# Patient Record
Sex: MALE | Race: BLACK OR AFRICAN AMERICAN | NOT HISPANIC OR LATINO | Employment: FULL TIME | URBAN - METROPOLITAN AREA
[De-identification: names, ages, dates, MRNs, and addresses within clinical notes are randomized per-mention and may not be internally consistent; named-entity substitution may affect disease eponyms.]

---

## 2019-04-19 ENCOUNTER — HOSPITAL ENCOUNTER (EMERGENCY)
Facility: HOSPITAL | Age: 29
Discharge: HOME | End: 2019-04-19
Attending: EMERGENCY MEDICINE

## 2019-04-19 VITALS
SYSTOLIC BLOOD PRESSURE: 163 MMHG | HEART RATE: 83 BPM | OXYGEN SATURATION: 98 % | BODY MASS INDEX: 35.21 KG/M2 | HEIGHT: 72 IN | WEIGHT: 260 LBS | TEMPERATURE: 99 F | DIASTOLIC BLOOD PRESSURE: 101 MMHG | RESPIRATION RATE: 18 BRPM

## 2019-04-19 DIAGNOSIS — J36 PERITONSILLAR ABSCESS: Primary | ICD-10-CM

## 2019-04-19 PROCEDURE — 99281 EMR DPT VST MAYX REQ PHY/QHP: CPT

## 2019-04-19 RX ORDER — NAPROXEN 500 MG/1
500 TABLET ORAL 2 TIMES DAILY
COMMUNITY

## 2019-04-19 NOTE — DISCHARGE INSTRUCTIONS
Go get the prescription of clindamycin filled that was prescribed to you at [facility].  Continue to take tylenol or motrin as needed for fever and pain.  Call and schedule a follow up appointment your primary care provider and ENT doctor.  For worsening symptoms, chest pain, shortness of breath, increased abdominal pain, high grade fever, stroke or stroke like symptoms, immediately go to the nearest Emergency Room or call 911 as soon as possible.

## 2019-04-19 NOTE — ED PROVIDER NOTES
Encounter Date: 4/19/2019    SCRIBE #1 NOTE: I, Brent Shad, am scribing for, and in the presence of, Monae Goode PA-C.       History     Chief Complaint   Patient presents with    swollen tonsils       Time seen by provider: 10:39 AM on 04/19/2019    Severino Mc is a 29 y.o. male with no PMHx who presents to the ED with an onset of swollen tonsils for three days. He complains of runny nose, congestion, fever, and pain with swallowing. He says the pain is mainly on the left side. Pt states he went to the emergency room x3 days ago and says they gave him a steroid shot and an antibiotic shot stating he likely had strep. He went to [facility] Emergency room again yesterday and was told he may have an abscess, however he denies them prescribing any medications or running any tests. Pt states taking Aleve since leaving the emergency room yesterday. No PSHx noted. No known drug allergies noted. He denies voice change or difficulty clearing secretions.     The history is provided by the patient.     Review of patient's allergies indicates:  No Known Allergies  History reviewed. No pertinent past medical history.  No past surgical history on file.  No family history on file.  Social History     Tobacco Use    Smoking status: Not on file   Substance Use Topics    Alcohol use: Not on file    Drug use: Not on file     Review of Systems   Constitutional: Positive for fever. Negative for activity change, appetite change and chills.   HENT: Positive for congestion, rhinorrhea, sore throat and trouble swallowing. Negative for voice change.    Respiratory: Negative for shortness of breath.    Cardiovascular: Negative for chest pain.   Gastrointestinal: Negative for abdominal pain and vomiting.   Genitourinary: Negative for dysuria and frequency.   Musculoskeletal: Negative for neck pain and neck stiffness.   Skin: Negative for rash.   Neurological: Negative for dizziness, speech difficulty and headaches.        Physical Exam     Initial Vitals [04/19/19 1031]   BP Pulse Resp Temp SpO2   (!) 163/101 83 18 98.6 °F (37 °C) 98 %      MAP       --         Physical Exam    Nursing note and vitals reviewed.  Constitutional: He appears well-developed and well-nourished. He is cooperative.  Non-toxic appearance. He does not have a sickly appearance.   HENT:   Head: Normocephalic and atraumatic.   Right Ear: Tympanic membrane, external ear and ear canal normal.   Left Ear: Tympanic membrane, external ear and ear canal normal.   Nose: Nose normal.   Mouth/Throat: Uvula is midline. No trismus in the jaw. Oropharyngeal exudate, posterior oropharyngeal edema, posterior oropharyngeal erythema and tonsillar abscesses present.   Bilateral tonsillar swelling with erythema L > R. Exudate noted to left tonsil. Clearing secretions without difficulty. No trismus.    Eyes: Conjunctivae, EOM and lids are normal.   Neck: Normal range of motion, full passive range of motion without pain and phonation normal. Neck supple. No thyroid mass present.   Cardiovascular: Normal rate, regular rhythm and normal heart sounds. Exam reveals no gallop and no friction rub.    No murmur heard.  Pulmonary/Chest: Breath sounds normal. He has no wheezes. He has no rhonchi. He has no rales.   Abdominal: Normal appearance.   Musculoskeletal: Normal range of motion.   Neurological: He is alert.   Skin: Skin is warm, dry and intact. No rash noted. No erythema.         ED Course   Procedures  Labs Reviewed - No data to display       Imaging Results    None          Medical Decision Making:   History:   Old Medical Records: I decided to obtain old medical records.  Differential Diagnosis:   Strep  Viral pharyngitis  peritonsillar abscess   ED Management:  Urgent evaluation of a 29-year-old male who presents with tonsillar swelling and fever.  He reports going to [facility] and being diagnosed with strep.  At that time he was given a dose of IM steroids and antibiotics.  He  returned yesterday and was told that he had a abscess and was to take Aleve.  He states they did not do anything else at that time it did not prescribe him antibiotics.  He is clearing secretions without difficulty.  He has no trismus.  He does have bilateral tonsillar swelling with erythema left greater than right.  He has some exudate noted to the left tonsil.  The uvula is midline.  His airway is patent. Physical exam is concerning for peritonsillar abscess.  Notes from [facility] obtained which showed that he had labs, CT scan and was given clindamycin yesterday. See attached report. He was also discharged with clindamycin.  When asked about his workup in antibiotics he states that the prescription is still on his glove box and he has not had a chance to get it filled.  I discussed with him that he was appropriately treated, diagnosed and discharged on the correct medications.  His symptoms will not improve he does not take the medicines as prescribed.  He assures me that he still has the prescription and will go get it filled. He is to follow up with ENT. Return precautions given. Based on my clinical evaluation, I do not appreciate any immediate, emergent, or life threatening condition or etiology that warrants additional workup today and feel that the patient can be discharged with close follow up care.  Patient is to follow up with their primary care provider. Case was discussed with Dr. Winters who is in agreement with the plan of care. All questions answered.                         Scribe Attestation:   Scribe #1: I performed the above scribed service and the documentation accurately describes the services I performed. I attest to the accuracy of the note.    Attending Attestation:     Physician Attestation Statement for NP/PA:   I discussed this assessment and plan of this patient with the NP/PA, but I did not personally examine the patient. The face to face encounter was performed by the  NP/PA.    Other NP/PA Attestation Additions:    History of Present Illness: 29-year-old male presented with a chief complaint of swollen tonsils.    Medical Decision Making: Initial differential diagnosis included but not limited to peritonsillar abscess, tonsillitis, and pharyngitis.  The patient had a complete workup done yesterday at [facility], including a CT scan that showed inflammation, versus a possible early abscess.  The patient also was started on clindamycin there in the emergency department, and he has yet to fill his prescription for the p.o. antibiotic.  There is no need for further workup at this time and the patient is encouraged to fill his antibiotic prescription and to follow up with the ENT as he is already been told.  I am in agreement with the physician assistant's  assessment, treatment, and plan of care.             I, Monae Goode PA-C, personally performed the services described in this documentation. All medical record entries made by the scribe were at my direction and in my presence.  I have reviewed the chart and agree that the record reflects my personal performance and is accurate and complete. Monae Goode PA-C.  12:09 PM 04/19/2019          Clinical Impression:       ICD-10-CM ICD-9-CM   1. Peritonsillar abscess J36 475         Disposition:   Disposition: Discharged  Condition: Stable                        Monae Goode PA-C  04/19/19 1211       Trung Winters MD  04/19/19 1300

## 2019-04-19 NOTE — ED NOTES
"C/o ongoing swollen painful sore throat with "white spot" on the back, spitting into bag states too painful to swallow. Has been seen twice at [facility] ER given steroids and antibiotics. Even non labored respirations. Wife at bedside aware to notify nurse of needs or concerns.   "

## 2019-04-19 NOTE — ED NOTES
Given written and verbal DC instructions questions answered per MD aware to follow up with PCP encouraged to return if needed. Aware to take RX as prescribed with teaching.

## 2019-12-18 ENCOUNTER — HOSPITAL ENCOUNTER (EMERGENCY)
Facility: HOSPITAL | Age: 29
Discharge: HOME | End: 2019-12-18
Attending: EMERGENCY MEDICINE

## 2019-12-18 VITALS
TEMPERATURE: 98 F | HEART RATE: 101 BPM | HEIGHT: 72 IN | RESPIRATION RATE: 18 BRPM | SYSTOLIC BLOOD PRESSURE: 130 MMHG | OXYGEN SATURATION: 99 % | BODY MASS INDEX: 37.25 KG/M2 | WEIGHT: 275 LBS | DIASTOLIC BLOOD PRESSURE: 79 MMHG

## 2019-12-18 DIAGNOSIS — M53.3 COCCYX PAIN: ICD-10-CM

## 2019-12-18 DIAGNOSIS — M54.9 ACUTE BACK PAIN, UNSPECIFIED BACK LOCATION, UNSPECIFIED BACK PAIN LATERALITY: ICD-10-CM
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
## 2019-12-18 DIAGNOSIS — R52 PAIN: ICD-10-CM

## 2019-12-18 PROCEDURE — 63600175 PHARM REV CODE 636 W HCPCS: Performed by: NURSE PRACTITIONER

## 2019-12-18 PROCEDURE — 99284 EMERGENCY DEPT VISIT MOD MDM: CPT | Mod: 25

## 2019-12-18 PROCEDURE — 96372 THER/PROPH/DIAG INJ SC/IM: CPT | Mod: 59

## 2019-12-18 RX ORDER — DICLOFENAC SODIUM 50 MG/1
50 TABLET, DELAYED RELEASE ORAL 3 TIMES DAILY PRN
Qty: 20 TABLET | Refills: 2 | Status: SHIPPED | OUTPATIENT
Start: 2019-12-18

## 2019-12-18 RX ORDER — METHOCARBAMOL 500 MG/1
500 TABLET, FILM COATED ORAL 3 TIMES DAILY
Qty: 30 TABLET | Refills: 0 | Status: SHIPPED | OUTPATIENT
Start: 2019-12-18 | End: 2019-12-23

## 2019-12-18 RX ORDER — NAPROXEN 250 MG/1
500 TABLET ORAL
Status: DISCONTINUED | OUTPATIENT
Start: 2019-12-18 | End: 2019-12-18

## 2019-12-18 RX ORDER — DEXAMETHASONE SODIUM PHOSPHATE 4 MG/ML
10 INJECTION, SOLUTION INTRA-ARTICULAR; INTRALESIONAL; INTRAMUSCULAR; INTRAVENOUS; SOFT TISSUE
Status: COMPLETED | OUTPATIENT
Start: 2019-12-18 | End: 2019-12-18

## 2019-12-18 RX ADMIN — DEXAMETHASONE SODIUM PHOSPHATE 10 MG: 4 INJECTION, SOLUTION INTRAMUSCULAR; INTRAVENOUS at 05:12

## 2019-12-18 NOTE — ED TRIAGE NOTES
Pt states he fell off of an 8 foot ladder and landed on his butt on pavers and was having tailbone pain that has radiated to his back

## 2019-12-18 NOTE — ED PROVIDER NOTES
Encounter Date: 12/18/2019       History     Chief Complaint   Patient presents with    Fall     Presents with coccyx pain after falling off a ladder on Monday         Review of patient's allergies indicates:  No Known Allergies  No past medical history on file.  No past surgical history on file.  No family history on file.  Social History     Tobacco Use    Smoking status: Not on file   Substance Use Topics    Alcohol use: Not on file    Drug use: Not on file     Review of Systems   Constitutional: Negative for fever.   Respiratory: Negative for cough, shortness of breath and wheezing.    Cardiovascular: Negative for chest pain, palpitations and leg swelling.   Gastrointestinal: Negative for abdominal pain, diarrhea, nausea and vomiting.   Genitourinary:        Neg for loss of bowel or bladder   Musculoskeletal: Positive for back pain.   Skin: Negative for rash.   Neurological: Negative for weakness.       Physical Exam     Initial Vitals [12/18/19 1251]   BP Pulse Resp Temp SpO2   126/82 101 20 98.2 °F (36.8 °C) 97 %      MAP       --         Physical Exam    Constitutional: He appears well-developed and well-nourished.   HENT:   Mouth/Throat: Oropharynx is clear and moist.   Eyes: Conjunctivae are normal.   Neck: Normal range of motion. Neck supple.   Cardiovascular: Normal rate.   Pulmonary/Chest: Breath sounds normal.   Abdominal: Soft. Bowel sounds are normal.   Musculoskeletal: Normal range of motion.   Pain increases in the sitting position  Neg for bony tenderness.  Straight leg test neg bilaterally moves all extremities without difficulty    Neurological: He is alert and oriented to person, place, and time. He has normal strength. No sensory deficit. GCS score is 15. GCS eye subscore is 4. GCS verbal subscore is 5. GCS motor subscore is 6.   Skin: Skin is warm. Capillary refill takes less than 2 seconds.   Psychiatric: He has a normal mood and affect. Thought content normal.         ED Course    Procedures  Labs Reviewed - No data to display       Imaging Results          X-Ray Lumbar Spine Complete 5 View (Final result)  Result time 12/18/19 15:42:43   Procedure changed from X-Ray Lumbar Spine Ap And Lateral     Final result by Young Doyle MD (12/18/19 15:42:43)                 Impression:      Negative lumbar spine radiographs.      Electronically signed by: Young Doyle MD  Date:    12/18/2019  Time:    15:42             Narrative:    EXAMINATION:  XR LUMBAR SPINE COMPLETE 5 VIEW    CLINICAL HISTORY:  Low back pain post trauma sustained in 8 foot fall off of ladder.    FINDINGS:  Five views of the lumbar spine with no prior studies for comparison show normal lordotic curvature and vertebral body alignment, with no acute fractures or destructive osseous lesions. The intervertebral disc spaces are preserved, with no facet arthropathy or spondylolysis.    The sacroiliac joints are normal, with normal bony mineralization.  There are no radiopaque foreign bodies.                               X-Ray Sacrum And Coccyx (Final result)  Result time 12/18/19 14:57:05    Final result by Jason Suresh MD (12/18/19 14:57:05)                 Impression:      No acute osseous abnormality.      Electronically signed by: Jason Suresh MD  Date:    12/18/2019  Time:    14:57             Narrative:    EXAMINATION:  XR SACRUM AND COCCYX    CLINICAL HISTORY:  FALL;    FINDINGS:  Four radiographic views of the sacrum show no fracture, dislocation, or destructive osseous lesion. Soft tissues are unremarkable.                               X-Ray Pelvis Complete min 3 views (Final result)  Result time 12/18/19 14:56:27    Final result by Jason Suresh MD (12/18/19 14:56:27)                 Impression:      Unremarkable radiograph of the pelvis and hips.      Electronically signed by: Jason Suresh MD  Date:    12/18/2019  Time:    14:56             Narrative:    EXAMINATION:  XR PELVIS COMPLETE MIN 3 VIEWS    CLINICAL  HISTORY:  Pain, unspecified    FINDINGS:  Three AP radiographic views of the pelvis.  No fracture or dislocation identified.  The SI joints and pubic symphysis are intact.  The hip joints are intact.                                              Attending Attestation:     Physician Attestation Statement for NP/PA:   I discussed this assessment and plan of this patient with the NP/PA, but I did not personally examine the patient. The face to face encounter was performed by the NP/PA.                                Clinical Impression:       ICD-10-CM ICD-9-CM   1. Fall, initial encounter W19.XXXA E888.9   2. Pain R52 780.96   3. Coccyx pain M53.3 724.79   4. Acute back pain, unspecified back location, unspecified back pain laterality M54.9 724.5                             Anjali Bethea NP  12/18/19 3970       Simeon Barcenas MD  12/18/19 8328

## 2021-08-23 ENCOUNTER — HOSPITAL ENCOUNTER (EMERGENCY)
Facility: HOSPITAL | Age: 31
Discharge: HOME | End: 2021-08-23
Attending: EMERGENCY MEDICINE

## 2021-08-23 VITALS
TEMPERATURE: 98 F | WEIGHT: 280 LBS | SYSTOLIC BLOOD PRESSURE: 155 MMHG | HEART RATE: 91 BPM | DIASTOLIC BLOOD PRESSURE: 79 MMHG | RESPIRATION RATE: 18 BRPM | OXYGEN SATURATION: 99 % | HEIGHT: 72 IN | BODY MASS INDEX: 37.93 KG/M2

## 2021-08-23 DIAGNOSIS — M79.605 PAIN OF LEFT LOWER EXTREMITY: ICD-10-CM

## 2021-08-23 DIAGNOSIS — S86.112A RUPTURE OF LEFT GASTROCNEMIUS TENDON, INITIAL ENCOUNTER: Primary | ICD-10-CM

## 2021-08-23 PROCEDURE — 99284 EMERGENCY DEPT VISIT MOD MDM: CPT | Mod: 25

## 2021-08-27 ENCOUNTER — TELEPHONE (OUTPATIENT)
Dept: ORTHOPEDICS | Facility: CLINIC | Age: 31
End: 2021-08-27

## 2022-08-13 ENCOUNTER — HOSPITAL ENCOUNTER (EMERGENCY)
Facility: HOSPITAL | Age: 32
Discharge: HOME | End: 2022-08-13
Attending: EMERGENCY MEDICINE

## 2022-08-13 VITALS
SYSTOLIC BLOOD PRESSURE: 130 MMHG | TEMPERATURE: 98 F | BODY MASS INDEX: 40.63 KG/M2 | HEIGHT: 72 IN | DIASTOLIC BLOOD PRESSURE: 66 MMHG | WEIGHT: 300 LBS | RESPIRATION RATE: 20 BRPM | HEART RATE: 62 BPM | OXYGEN SATURATION: 100 %

## 2022-08-13 DIAGNOSIS — S86.811A STRAIN OF CALF MUSCLE, RIGHT, INITIAL ENCOUNTER: Primary | ICD-10-CM

## 2022-08-13 PROCEDURE — 99282 EMERGENCY DEPT VISIT SF MDM: CPT

## 2022-08-13 NOTE — Clinical Note
"Severino Juárezsophy Mc was seen and treated in our emergency department on 8/13/2022.  He may return to work on 08/26/2022.       If you have any questions or concerns, please don't hesitate to call.      Alex Strauss MD"

## 2022-08-13 NOTE — ED PROVIDER NOTES
"Encounter Date: 8/13/2022    SCRIBE #1 NOTE: I, Ann Mariebhavani Toro, am scribing for, and in the presence of, Alex Strauss MD.       History     Chief Complaint   Patient presents with    Leg Pain     Right calf pain while playing basketball yesterday, felt "pop"; denies numbness and tingling      Time seen by provider: 10:07 AM on 08/13/2022    Severino Mc is a 32 y.o. male who presents to the ED with an onset of pain in his right calf that began yesterday when he was playing basketball. The patient was pushing off of his right leg while running when he felt a pop and burning sensation in his calf. He thought he was cramping so he rested and drank some water. He was able to walk yesterday but states that it was painful. Today he could barely move his foot so he came to the ED. The patient had a similar injury last year in the left leg and states that his pain this time is different and not as bad. The patient denies foot pain, knee pain or any other symptoms at this time. He has no recorded PMHx and no pertinent PSHx.    The history is provided by the patient.     Review of patient's allergies indicates:  No Known Allergies  History reviewed. No pertinent past medical history.  Past Surgical History:   Procedure Laterality Date    EXTERNAL EAR SURGERY       History reviewed. No pertinent family history.  Social History     Tobacco Use    Smoking status: Unknown If Ever Smoked   Substance Use Topics    Alcohol use: Not Currently    Drug use: Not Currently     Review of Systems   Musculoskeletal: Positive for gait problem and myalgias.        Positive for calf pain. Negative for knee pain. Negative for foot pain.   Skin: Negative for wound.   Neurological: Negative for numbness.       Physical Exam     Initial Vitals [08/13/22 0959]   BP Pulse Resp Temp SpO2   130/66 62 20 98.3 °F (36.8 °C) 100 %      MAP       --         Physical Exam    Nursing note and vitals reviewed.  Constitutional: He appears " well-developed and well-nourished. He is not diaphoretic.  Non-toxic appearance. He does not have a sickly appearance. He does not appear ill. No distress.   HENT:   Head: Normocephalic and atraumatic.   Eyes: EOM are normal.   Neck: Neck supple.   Normal range of motion.  Pulmonary/Chest: No respiratory distress.   Musculoskeletal:         General: Normal range of motion.      Cervical back: Normal range of motion and neck supple. No rigidity. Normal range of motion.      Comments: The patient has tenderness in the right calf. There is no tenderness in the right Achilles tendon. He has normal dorsal and plantar flexion.      Neurological: He is alert and oriented to person, place, and time.   Skin: Skin is warm and dry. No rash noted.   Psychiatric: He has a normal mood and affect. His behavior is normal. Judgment and thought content normal.         ED Course   Procedures  Labs Reviewed - No data to display       Imaging Results    None          Medications - No data to display  Medical Decision Making:   History:   Old Medical Records: I decided to obtain old medical records.          Scribe Attestation:   Scribe #1: I performed the above scribed service and the documentation accurately describes the services I performed. I attest to the accuracy of the note.        ED Course as of 08/13/22 1530   Sat Aug 13, 2022   1005 Resp: 20 [EF]   1005 SpO2: 100 % [EF]   1005 Pulse: 62 [EF]   1005 Temp src: Oral [EF]   1005 Temp: 98.3 °F (36.8 °C) [EF]   1005 BP: 130/66 [EF]   1014 Patient presents with right calf pain which began while playing basketball.  He has no Achilles tenderness.  He is able to plantar flex and dorsiflex normally.  Similar injury last year resolved after 1 and half weeks.  He states at that time he was given crutches and an Ace wrap.  We will repeat this treatment.  He is referred to Orthopedics if his pain continues.  He is given a work note for 2 weeks. [EF]      ED Course User Index  [EF] Alex CORTES  MD Carlos A           I, Dr. Strauss, personally performed the services described in this documentation. All medical record entries made by the scribe were at my direction and in my presence.  I have reviewed the chart and agree that the record reflects my personal performance and is accurate and complete.3:30 PM 08/13/2022      Clinical Impression:   Final diagnoses:  [S86.811A] Strain of calf muscle, right, initial encounter (Primary)          ED Disposition Condition    Discharge Stable        ED Prescriptions     None        Follow-up Information     Follow up With Specialties Details Why Contact Info    [facility] Emergency Dept Emergency Medicine  If symptoms worsen, As needed [address] [place] [id number] [phone]    Tr Love MD Orthopedic Surgery, Surgery, Sports Medicine Schedule an appointment as soon as possible for a visit   [address] [facility] [address]  [facility] [address]  [phone]             Alex Strauss MD  08/13/22 8534

## 2025-09-01 ENCOUNTER — HOSPITAL ENCOUNTER (EMERGENCY)
Facility: HOSPITAL | Age: 35
Discharge: HOME | End: 2025-09-01
Attending: EMERGENCY MEDICINE
Payer: OTHER MISCELLANEOUS

## 2025-09-01 VITALS
TEMPERATURE: 98 F | HEART RATE: 75 BPM | DIASTOLIC BLOOD PRESSURE: 83 MMHG | WEIGHT: 280 LBS | HEIGHT: 72 IN | RESPIRATION RATE: 16 BRPM | SYSTOLIC BLOOD PRESSURE: 146 MMHG | BODY MASS INDEX: 37.93 KG/M2 | OXYGEN SATURATION: 98 %

## 2025-09-01 DIAGNOSIS — S61.211A LACERATION OF LEFT INDEX FINGER WITHOUT FOREIGN BODY WITHOUT DAMAGE TO NAIL, INITIAL ENCOUNTER: Primary | ICD-10-CM

## 2025-09-01 PROCEDURE — 12001 RPR S/N/AX/GEN/TRNK 2.5CM/<: CPT

## 2025-09-01 PROCEDURE — 63600175 PHARM REV CODE 636 W HCPCS: Performed by: NURSE PRACTITIONER

## 2025-09-01 PROCEDURE — 99284 EMERGENCY DEPT VISIT MOD MDM: CPT | Mod: 25

## 2025-09-01 PROCEDURE — 90471 IMMUNIZATION ADMIN: CPT | Performed by: NURSE PRACTITIONER

## 2025-09-01 PROCEDURE — 25000003 PHARM REV CODE 250: Performed by: NURSE PRACTITIONER

## 2025-09-01 PROCEDURE — 90715 TDAP VACCINE 7 YRS/> IM: CPT | Performed by: NURSE PRACTITIONER

## 2025-09-01 RX ORDER — ERYTHROMYCIN 5 MG/G
OINTMENT OPHTHALMIC
Status: COMPLETED | OUTPATIENT
Start: 2025-09-01 | End: 2025-09-01

## 2025-09-01 RX ORDER — LIDOCAINE HYDROCHLORIDE 10 MG/ML
10 INJECTION, SOLUTION EPIDURAL; INFILTRATION; INTRACAUDAL; PERINEURAL
Status: COMPLETED | OUTPATIENT
Start: 2025-09-01 | End: 2025-09-01

## 2025-09-01 RX ORDER — GENTAMICIN SULFATE 3 MG/ML
2 SOLUTION/ DROPS OPHTHALMIC 4 TIMES DAILY
Qty: 15 ML | Refills: 0 | Status: SHIPPED | OUTPATIENT
Start: 2025-09-01

## 2025-09-01 RX ORDER — CEPHALEXIN 500 MG/1
500 CAPSULE ORAL 4 TIMES DAILY
Qty: 40 CAPSULE | Refills: 0 | Status: SHIPPED | OUTPATIENT
Start: 2025-09-01 | End: 2025-09-11

## 2025-09-01 RX ADMIN — CLOSTRIDIUM TETANI TOXOID ANTIGEN (FORMALDEHYDE INACTIVATED), CORYNEBACTERIUM DIPHTHERIAE TOXOID ANTIGEN (FORMALDEHYDE INACTIVATED), BORDETELLA PERTUSSIS TOXOID ANTIGEN (GLUTARALDEHYDE INACTIVATED), BORDETELLA PERTUSSIS FILAMENTOUS HEMAGGLUTININ ANTIGEN (FORMALDEHYDE INACTIVATED), BORDETELLA PERTUSSIS PERTACTIN ANTIGEN, AND BORDETELLA PERTUSSIS FIMBRIAE 2/3 ANTIGEN 0.5 ML: 5; 2; 2.5; 5; 3; 5 INJECTION, SUSPENSION INTRAMUSCULAR at 05:09

## 2025-09-01 RX ADMIN — ERYTHROMYCIN: 5 OINTMENT OPHTHALMIC at 05:09

## 2025-09-01 RX ADMIN — LIDOCAINE HYDROCHLORIDE 100 MG: 10 INJECTION, SOLUTION EPIDURAL; INFILTRATION; INTRACAUDAL at 05:09
